# Patient Record
Sex: FEMALE | Race: WHITE | Employment: FULL TIME | URBAN - METROPOLITAN AREA
[De-identification: names, ages, dates, MRNs, and addresses within clinical notes are randomized per-mention and may not be internally consistent; named-entity substitution may affect disease eponyms.]

---

## 2018-08-21 PROBLEM — Z34.90 PREGNANCY: Status: ACTIVE | Noted: 2018-08-21

## 2018-08-21 PROBLEM — O09.30 LATE PRENATAL CARE AFFECTING PREGNANCY: Status: ACTIVE | Noted: 2018-08-21

## 2018-08-21 PROBLEM — Q87.19 NOONAN SYNDROME: Status: ACTIVE | Noted: 2018-08-21

## 2018-09-05 PROBLEM — F19.10 POLYSUBSTANCE ABUSE (HCC): Status: RESOLVED | Noted: 2017-07-25 | Resolved: 2018-09-05

## 2018-09-05 PROBLEM — F10.20 SEVERE ALCOHOL USE DISORDER (HCC): Status: ACTIVE | Noted: 2017-06-15

## 2018-09-05 PROBLEM — T50.901A OVERDOSE: Status: ACTIVE | Noted: 2017-06-12

## 2018-09-05 PROBLEM — F10.929 ALCOHOLIC INTOXICATION (HCC): Status: ACTIVE | Noted: 2017-06-12

## 2018-09-05 PROBLEM — J69.0 ASPIRATION PNEUMONIA OF LEFT UPPER LOBE (HCC): Status: RESOLVED | Noted: 2017-07-25 | Resolved: 2018-09-05

## 2018-09-05 PROBLEM — I46.9 CARDIAC ARREST (HCC): Status: ACTIVE | Noted: 2017-06-12

## 2018-09-05 PROBLEM — F10.929 ALCOHOLIC INTOXICATION (HCC): Status: RESOLVED | Noted: 2017-06-12 | Resolved: 2018-09-05

## 2018-09-05 PROBLEM — F25.0 SCHIZOAFFECTIVE DISORDER, BIPOLAR TYPE (HCC): Status: ACTIVE | Noted: 2017-06-15

## 2018-09-05 PROBLEM — F19.10 POLYSUBSTANCE ABUSE (HCC): Status: ACTIVE | Noted: 2017-07-25

## 2018-09-05 PROBLEM — J69.0 ASPIRATION PNEUMONIA OF LEFT UPPER LOBE (HCC): Status: ACTIVE | Noted: 2017-07-25

## 2018-09-05 PROBLEM — F10.20 SEVERE ALCOHOL USE DISORDER (HCC): Status: RESOLVED | Noted: 2017-06-15 | Resolved: 2018-09-05

## 2018-09-05 PROBLEM — I46.9 CARDIAC ARREST (HCC): Status: RESOLVED | Noted: 2017-06-12 | Resolved: 2018-09-05

## 2018-09-05 PROBLEM — T50.901A OVERDOSE: Status: RESOLVED | Noted: 2017-06-12 | Resolved: 2018-09-05

## 2018-10-04 PROBLEM — Z23 ENCOUNTER FOR IMMUNIZATION: Status: ACTIVE | Noted: 2018-10-04

## 2019-01-25 RX ORDER — OXYTOCIN/RINGER'S LACTATE 15/250 ML
250 PLASTIC BAG, INJECTION (ML) INTRAVENOUS ONCE
Status: CANCELLED | OUTPATIENT
Start: 2019-01-25 | End: 2019-01-25

## 2019-01-25 RX ORDER — MINERAL OIL
120 OIL (ML) ORAL
Status: CANCELLED | OUTPATIENT
Start: 2019-01-25 | End: 2019-01-26

## 2019-01-25 RX ORDER — SODIUM CHLORIDE 0.9 % (FLUSH) 0.9 %
5-40 SYRINGE (ML) INJECTION AS NEEDED
Status: CANCELLED | OUTPATIENT
Start: 2019-01-25

## 2019-01-25 RX ORDER — OXYTOCIN/RINGER'S LACTATE 30/500 ML
0-25 PLASTIC BAG, INJECTION (ML) INTRAVENOUS
Status: CANCELLED | OUTPATIENT
Start: 2019-01-25

## 2019-01-25 RX ORDER — DEXTROSE, SODIUM CHLORIDE, SODIUM LACTATE, POTASSIUM CHLORIDE, AND CALCIUM CHLORIDE 5; .6; .31; .03; .02 G/100ML; G/100ML; G/100ML; G/100ML; G/100ML
125 INJECTION, SOLUTION INTRAVENOUS CONTINUOUS
Status: CANCELLED | OUTPATIENT
Start: 2019-01-25

## 2019-01-25 RX ORDER — BUTORPHANOL TARTRATE 1 MG/ML
1 INJECTION INTRAMUSCULAR; INTRAVENOUS
Status: CANCELLED | OUTPATIENT
Start: 2019-01-25

## 2019-01-25 RX ORDER — LIDOCAINE HYDROCHLORIDE 20 MG/ML
JELLY TOPICAL
Status: CANCELLED | OUTPATIENT
Start: 2019-01-25 | End: 2019-01-26

## 2019-01-25 RX ORDER — LIDOCAINE HYDROCHLORIDE 10 MG/ML
1 INJECTION INFILTRATION; PERINEURAL
Status: CANCELLED | OUTPATIENT
Start: 2019-01-25 | End: 2019-01-26

## 2019-01-25 RX ORDER — SODIUM CHLORIDE 0.9 % (FLUSH) 0.9 %
5-40 SYRINGE (ML) INJECTION EVERY 8 HOURS
Status: CANCELLED | OUTPATIENT
Start: 2019-01-25

## 2019-01-29 ENCOUNTER — ANESTHESIA (OUTPATIENT)
Dept: LABOR AND DELIVERY | Age: 24
End: 2019-01-29
Payer: COMMERCIAL

## 2019-01-29 ENCOUNTER — HOSPITAL ENCOUNTER (INPATIENT)
Age: 24
LOS: 2 days | Discharge: HOME | End: 2019-01-31
Attending: OBSTETRICS & GYNECOLOGY | Admitting: OBSTETRICS & GYNECOLOGY
Payer: COMMERCIAL

## 2019-01-29 ENCOUNTER — ANESTHESIA EVENT (OUTPATIENT)
Dept: LABOR AND DELIVERY | Age: 24
End: 2019-01-29
Payer: COMMERCIAL

## 2019-01-29 PROBLEM — R10.9 ABDOMINAL PAIN DURING PREGNANCY, THIRD TRIMESTER: Status: ACTIVE | Noted: 2019-01-29

## 2019-01-29 PROBLEM — O26.893 ABDOMINAL PAIN DURING PREGNANCY, THIRD TRIMESTER: Status: ACTIVE | Noted: 2019-01-29

## 2019-01-29 LAB
ABO + RH BLD: NORMAL
ARTERIAL PATENCY WRIST A: ABNORMAL
BASE DEFICIT BLD-SCNC: 8 MMOL/L
BDY SITE: ABNORMAL
BLOOD GROUP ANTIBODIES SERPL: NORMAL
BODY TEMPERATURE: 98.6
CO2 BLD-SCNC: 22 MMOL/L
COLLECT TIME,HTIME: 2311
ERYTHROCYTE [DISTWIDTH] IN BLOOD BY AUTOMATED COUNT: 14.2 % (ref 11.9–14.6)
GAS FLOW.O2 O2 DELIVERY SYS: ABNORMAL L/MIN
HCO3 BLD-SCNC: 20.1 MMOL/L (ref 22–26)
HCT VFR BLD AUTO: 34.2 % (ref 35.8–46.3)
HGB BLD-MCNC: 11.5 G/DL (ref 11.7–15.4)
MCH RBC QN AUTO: 30.9 PG (ref 26.1–32.9)
MCHC RBC AUTO-ENTMCNC: 33.6 G/DL (ref 31.4–35)
MCV RBC AUTO: 91.9 FL (ref 79.6–97.8)
NRBC # BLD: 0 K/UL (ref 0–0.2)
PCO2 BLDCO: 48 MMHG (ref 32–68)
PH BLDCO: 7.23 [PH] (ref 7.15–7.38)
PLATELET # BLD AUTO: 353 K/UL (ref 150–450)
PMV BLD AUTO: 10.1 FL (ref 9.4–12.3)
PO2 BLDCO: 44 MMHG
RBC # BLD AUTO: 3.72 M/UL (ref 4.05–5.2)
SAO2 % BLD: 70 % (ref 95–98)
SERVICE CMNT-IMP: ABNORMAL
SPECIMEN EXP DATE BLD: NORMAL
SPECIMEN TYPE: ABNORMAL
WBC # BLD AUTO: 11 K/UL (ref 4.3–11.1)

## 2019-01-29 PROCEDURE — 74011250636 HC RX REV CODE- 250/636: Performed by: OBSTETRICS & GYNECOLOGY

## 2019-01-29 PROCEDURE — 77030002888 HC SUT CHRMC J&J -A

## 2019-01-29 PROCEDURE — 4A1HXCZ MONITORING OF PRODUCTS OF CONCEPTION, CARDIAC RATE, EXTERNAL APPROACH: ICD-10-PCS | Performed by: OBSTETRICS & GYNECOLOGY

## 2019-01-29 PROCEDURE — 65270000029 HC RM PRIVATE

## 2019-01-29 PROCEDURE — 0UQMXZZ REPAIR VULVA, EXTERNAL APPROACH: ICD-10-PCS | Performed by: OBSTETRICS & GYNECOLOGY

## 2019-01-29 PROCEDURE — 85027 COMPLETE CBC AUTOMATED: CPT

## 2019-01-29 PROCEDURE — 99284 EMERGENCY DEPT VISIT MOD MDM: CPT | Performed by: OBSTETRICS & GYNECOLOGY

## 2019-01-29 PROCEDURE — 74011250636 HC RX REV CODE- 250/636

## 2019-01-29 PROCEDURE — A4300 CATH IMPL VASC ACCESS PORTAL: HCPCS | Performed by: ANESTHESIOLOGY

## 2019-01-29 PROCEDURE — 82803 BLOOD GASES ANY COMBINATION: CPT

## 2019-01-29 PROCEDURE — 86900 BLOOD TYPING SEROLOGIC ABO: CPT

## 2019-01-29 PROCEDURE — 74011000250 HC RX REV CODE- 250

## 2019-01-29 PROCEDURE — 77030014125 HC TY EPDRL BBMI -B: Performed by: ANESTHESIOLOGY

## 2019-01-29 RX ORDER — MINERAL OIL
120 OIL (ML) ORAL
Status: DISCONTINUED | OUTPATIENT
Start: 2019-01-29 | End: 2019-01-30 | Stop reason: HOSPADM

## 2019-01-29 RX ORDER — ONDANSETRON 2 MG/ML
INJECTION INTRAMUSCULAR; INTRAVENOUS
Status: DISCONTINUED
Start: 2019-01-29 | End: 2019-01-30

## 2019-01-29 RX ORDER — SODIUM CHLORIDE 0.9 % (FLUSH) 0.9 %
5-40 SYRINGE (ML) INJECTION AS NEEDED
Status: DISCONTINUED | OUTPATIENT
Start: 2019-01-29 | End: 2019-01-30

## 2019-01-29 RX ORDER — OXYTOCIN/RINGER'S LACTATE 30/500 ML
1-25 PLASTIC BAG, INJECTION (ML) INTRAVENOUS
Status: DISCONTINUED | OUTPATIENT
Start: 2019-01-29 | End: 2019-01-30

## 2019-01-29 RX ORDER — DEXTROSE, SODIUM CHLORIDE, SODIUM LACTATE, POTASSIUM CHLORIDE, AND CALCIUM CHLORIDE 5; .6; .31; .03; .02 G/100ML; G/100ML; G/100ML; G/100ML; G/100ML
125 INJECTION, SOLUTION INTRAVENOUS CONTINUOUS
Status: DISCONTINUED | OUTPATIENT
Start: 2019-01-29 | End: 2019-01-30

## 2019-01-29 RX ORDER — OXYTOCIN/RINGER'S LACTATE 15/250 ML
250 PLASTIC BAG, INJECTION (ML) INTRAVENOUS ONCE
Status: ACTIVE | OUTPATIENT
Start: 2019-01-29 | End: 2019-01-29

## 2019-01-29 RX ORDER — ONDANSETRON 2 MG/ML
4 INJECTION INTRAMUSCULAR; INTRAVENOUS ONCE
Status: DISCONTINUED | OUTPATIENT
Start: 2019-01-29 | End: 2019-01-30

## 2019-01-29 RX ORDER — LIDOCAINE HYDROCHLORIDE 10 MG/ML
1 INJECTION INFILTRATION; PERINEURAL
Status: DISCONTINUED | OUTPATIENT
Start: 2019-01-29 | End: 2019-01-30 | Stop reason: HOSPADM

## 2019-01-29 RX ORDER — SODIUM CHLORIDE 0.9 % (FLUSH) 0.9 %
5-40 SYRINGE (ML) INJECTION EVERY 8 HOURS
Status: DISCONTINUED | OUTPATIENT
Start: 2019-01-29 | End: 2019-01-30

## 2019-01-29 RX ORDER — LIDOCAINE HYDROCHLORIDE 20 MG/ML
JELLY TOPICAL
Status: DISCONTINUED | OUTPATIENT
Start: 2019-01-29 | End: 2019-01-30 | Stop reason: HOSPADM

## 2019-01-29 RX ORDER — PHENYLEPHRINE HYDROCHLORIDE 10 MG/ML
INJECTION INTRAVENOUS AS NEEDED
Status: DISCONTINUED | OUTPATIENT
Start: 2019-01-29 | End: 2019-01-29 | Stop reason: HOSPADM

## 2019-01-29 RX ORDER — OXYTOCIN/RINGER'S LACTATE 30/500 ML
PLASTIC BAG, INJECTION (ML) INTRAVENOUS
Status: COMPLETED
Start: 2019-01-29 | End: 2019-01-29

## 2019-01-29 RX ORDER — BUTORPHANOL TARTRATE 2 MG/ML
1 INJECTION INTRAMUSCULAR; INTRAVENOUS
Status: DISCONTINUED | OUTPATIENT
Start: 2019-01-29 | End: 2019-01-30 | Stop reason: HOSPADM

## 2019-01-29 RX ORDER — ROPIVACAINE HYDROCHLORIDE 2 MG/ML
INJECTION, SOLUTION EPIDURAL; INFILTRATION; PERINEURAL
Status: DISCONTINUED | OUTPATIENT
Start: 2019-01-29 | End: 2019-01-29 | Stop reason: HOSPADM

## 2019-01-29 RX ADMIN — OXYTOCIN 2 MILLI-UNITS: 10 INJECTION, SOLUTION INTRAMUSCULAR; INTRAVENOUS at 16:49

## 2019-01-29 RX ADMIN — SODIUM CHLORIDE, SODIUM LACTATE, POTASSIUM CHLORIDE, CALCIUM CHLORIDE, AND DEXTROSE MONOHYDRATE 125 ML/HR: 600; 310; 30; 20; 5 INJECTION, SOLUTION INTRAVENOUS at 16:50

## 2019-01-29 RX ADMIN — PHENYLEPHRINE HYDROCHLORIDE 100 MCG: 10 INJECTION INTRAVENOUS at 18:16

## 2019-01-29 RX ADMIN — OXYTOCIN 8 MILLI-UNITS/MIN: 10 INJECTION, SOLUTION INTRAMUSCULAR; INTRAVENOUS at 19:53

## 2019-01-29 RX ADMIN — BUTORPHANOL TARTRATE 1 MG: 2 INJECTION, SOLUTION INTRAMUSCULAR; INTRAVENOUS at 11:44

## 2019-01-29 RX ADMIN — SODIUM CHLORIDE, SODIUM LACTATE, POTASSIUM CHLORIDE, AND CALCIUM CHLORIDE 500 ML: 600; 310; 30; 20 INJECTION, SOLUTION INTRAVENOUS at 18:21

## 2019-01-29 RX ADMIN — ROPIVACAINE HYDROCHLORIDE 10 ML/HR: 2 INJECTION, SOLUTION EPIDURAL; INFILTRATION; PERINEURAL at 18:07

## 2019-01-29 NOTE — PROGRESS NOTES
SW consult received - will meet with patient after delivery as she's currently in labor. Mary Grace Becerril [address]

## 2019-01-29 NOTE — PROGRESS NOTES
Pt does not want to be admitted. Pt would like to go home. Insistent to go home until she is 42 weeks. Dr. Waldemar Herbert stressed the importance of deliverying now. Would like to walk and then be rechecked before deciding

## 2019-01-29 NOTE — PROGRESS NOTES
RN to bedside spoke with patient, notified that the patient could not come off the EFM at the present time due to a non-reactive FHR tracing Encouraged patient to go into a left tilt and a LR fluid bolus. Patient agreed to interventions

## 2019-01-29 NOTE — H&P
History & Physical 
 
Name: Imelda Pyle MRN: [id number]  SSN: [id number] YOB: 1995  Age: 26 y.o. Sex: female Chief Complaint Patient presents with  Contractions Subjective:  
 
Estimated Date of Delivery: 19 OB History  Para Term  AB Living 1 SAB TAB Ectopic Molar Multiple Live Births # Outcome Date GA Lbr Zia/2nd Weight Sex Delivery Anes PTL Lv  
1 Current Ms. Lorenza Atkins is seen with pregnancy at 41w1d for Increasingly painful contractions. Prenatal course was complicated by late prenatal care and psychotic issues. Was scheduled for induction last week, did not came due to transportation issues. the patients states that the baby moves as usual 
 Please see prenatal records for details. Past Medical History:  
Diagnosis Date  Alcoholic intoxication ([address] 2017 Last Assessment & Plan:  Alcohol level 136.  Aspiration pneumonia of left upper lobe ([address] 2017 Last Assessment & Plan:  Patient had an episode of aspiration pneumonia when she was admitted to hospital requiring mechanical ventilation and high levels of FiO2 and PEEP. She is now much better and back to baseline. Her chest x-ray shows complete resolution of the infiltrates. Do not think she needs any further follow-up.  Cardiac arrest ([address] 2017 Last Assessment & Plan:  S/p cardiac arrest. Patient realizes the significance of that event and the need to avoid the substance abuse that led to it. She is determined not to have that repeat.  Overdose 2017 Last Assessment & Plan:  Admitted to using cocaine, MDMA, marijuana and alcohol. UDS positive for cocaine and sympathomimetic amines. Will closely monitor BP and heart rate. Supportive care.  Polysubstance abuse ([address] 2017  Last Assessment & Plan:  She has abused multiple illegal substances in the past but is now being treated by a psychiatrist for her mood disorder and does not think she is going to use any of those anymore.  Severe alcohol use disorder ([facility] 6/15/2017 Past Surgical History:  
Procedure Laterality Date  HX WISDOM TEETH EXTRACTION Social History Occupational History  Not on file Tobacco Use  Smoking status: Former Smoker  Smokeless tobacco: Never Used  Tobacco comment: Socially Substance and Sexual Activity  Alcohol use: No  
 Drug use: No  
 Sexual activity: Yes  
  Partners: Male Birth control/protection: None Family History Problem Relation Age of Onset  Diabetes Maternal Grandmother No Known Allergies Prior to Admission medications Medication Sig Start Date End Date Taking? Authorizing Provider  
prenatal 47/iron/folate 1/dha (PNV-DHA PO) Take  by mouth. Yes Provider, Historical  
magnesium 250 mg tab Take  by mouth. Provider, Historical  
  
 
Review of Systems: 
Constitutional:No headache, fever Cardiac:   No chest pain Resp: No cough or shortness of breath GI:   No nausea/vomiting, diarrhea, abdominal pain :   No dysuria Neuro:     No vision changes, headache Objective:  
 
Vitals: 
Vitals:  
 01/29/19 0242 BP: 114/73 Pulse: 65 Resp: 18 Temp: 98.1 °F (36.7 °C) Physical Exam: 
Patient without distress. Heart: Regular rate and rhythm Lung: clear to auscultation throughout lung fields, no wheezes, no rales, no rhonchi and normal respiratory effort Back: costovertebral angle tenderness absent Abdomen: soft, nontender, without guarding, without rebound Fundus: soft and non tender Cervical Exam: 1 cm dilated 60% effaced 0 station Lower Extremities:  - Edema No 
Membranes:  Intact Fetal Heart Rate tracing: Category 1 Uterine contractions: irregular, every 5 minutes Prenatal Labs:  
Lab Results Component Value Date/Time  
 Rubella, External immune 08/21/2018 GrBStrep, External negative 2018 HBsAg, External negative 2018 HIV, External NR 2018 RPR, External NR 2018 Gonorrhea, External negative 2018 Chlamydia, External negative 2018 Assessment/Plan: Ms. Yo Steward is a  seen with pregnancy at 41w1d for Increasingly painful contractions. Post term at 41+ weeks. Plan:  
 
Admit for labor management, will start buccal cytotech Patient discussed with Dr. Scotty Evans MD

## 2019-01-29 NOTE — PROGRESS NOTES
Spoke with Dr Jhon Infante on the phone. MD ok with pt going home after reactive NST, negative CST, if pt continues to refuse augmentation, monitoring and other medical interventions at this time.

## 2019-01-29 NOTE — PROGRESS NOTES
Pt called out and asked to be checked. Cervix remains on the firmish side but is more mid than posterior now and is a very tight three. Pt encouraged to ambulate more. Will recheck at 0600

## 2019-01-29 NOTE — PROGRESS NOTES
Patient up to bathroom Desires to be checked when returns to bed Patient mother asked for me not to tell her her cervix hasn't changed if no increase in dilation

## 2019-01-29 NOTE — PROGRESS NOTES
Asked to come talk to patient. She was considering going back home as she is not in active labor. There is discussion among the family members, specifically from the paternal grandmother who is a  and very vocal about it being fine to allow the pregnancy to progress until 42 weeks to allow for spontaneous labor. The patient seems to be uncomfortable and intimidated by the paternal grandmother who is pressuring her to pursue expectant managment. I advised them that risk of  Stillbirth begins to significantly increase after 41 weeks, and that her regular physicians had felt she should be induced. Patient will walk and then be rechecked after a period of time to see if she is making cervical change. Marii Evans MD

## 2019-01-29 NOTE — ANESTHESIA PROCEDURE NOTES
Epidural Block Start time: 1/29/2019 5:58 PM 
End time: 1/29/2019 6:10 PM 
Performed by: Florian Negron MD 
Authorized by: Florian Negron MD  
 
Pre-Procedure Indication: labor epidural   
Preanesthetic Checklist: patient identified, risks and benefits discussed, anesthesia consent, site marked, patient being monitored, timeout performed and anesthesia consent Timeout Time: 17:59 Epidural:  
Patient position:  Seated Prep region:  Lumbar Prep: Chlorhexidine Location:  L3-4 Needle and Epidural Catheter:  
Needle Type:  Tuohy Needle Gauge:  17 G Injection Technique:  Loss of resistance using air Attempts:  1 Catheter Size:  19 G Catheter at Skin Depth (cm):  9 Depth in Epidural Space (cm):  5 Events: no blood with aspiration, no cerebrospinal fluid with aspiration, no paresthesia and negative aspiration test   
Test Dose:  Negative Assessment:  
Catheter Secured:  Tegaderm and tape Insertion:  Uncomplicated Patient tolerance:  Patient tolerated the procedure well with no immediate complications

## 2019-01-29 NOTE — PROGRESS NOTES
SVE dilataion irregular with external os a little more than three and internal a tight three. Cytotec held due to dilation. Admitted and transferred.

## 2019-01-29 NOTE — PROGRESS NOTES
Labor check Cervix 4-5 50 posterior Minimal to no change over last 2 hours Recommended pitocin- adamantly declines refuses AROM 
NST reactive No more narcotics- pt seems somewhat out of it from previous stadol Patient Vitals for the past 4 hrs: Mode Fetal Heart Rate Variability Decelerations Accelerations RN Reviewed Strip? Non Stress Test  
01/29/19 1418 External 125 (!) Less than or equal to 5 BPM None No    
01/29/19 1344 External 120 6-25 BPM None Yes Yes   
01/29/19 1328 External 125 6-25 BPM None Yes Yes   
01/29/19 1321 External 120 6-25 BPM Variable No    
01/29/19 1258 External 120 (!) Less than or equal to 5 BPM Variable No    
01/29/19 1231 External 125 6-25 BPM Variable No Yes   
01/29/19 1206 External 125 6-25 BPM None  Yes   
01/29/19 1154 External 120 6-25 BPM None No Yes   
01/29/19 1136 External 125 6-25 BPM None Yes Yes Reactive

## 2019-01-29 NOTE — PROGRESS NOTES
Pt arrived in [facility]. Complaints of contractions. No bleeding or leaking of fluid. Placed on EFM. Dr. Jammie Palafox at the bedside.

## 2019-01-29 NOTE — PROGRESS NOTES
Patient requesting IV pain medication Patient placed back on [facility]  
 
RN discussed with patient that there had been no cervical change in 2 hours Patient is considering agreeing to augmentation, wants to wait 1 more hour and then have cervix rechecked

## 2019-01-30 PROCEDURE — 74011250637 HC RX REV CODE- 250/637: Performed by: OBSTETRICS & GYNECOLOGY

## 2019-01-30 PROCEDURE — 75410000003 HC RECOV DEL/VAG/CSECN EA 0.5 HR

## 2019-01-30 PROCEDURE — 75410000002 HC LABOR FEE PER 1 HR

## 2019-01-30 PROCEDURE — 75410000000 HC DELIVERY VAGINAL/SINGLE

## 2019-01-30 PROCEDURE — 77030011943

## 2019-01-30 PROCEDURE — 76060000078 HC EPIDURAL ANESTHESIA

## 2019-01-30 PROCEDURE — 65270000029 HC RM PRIVATE

## 2019-01-30 RX ORDER — DOCUSATE SODIUM 100 MG/1
100 CAPSULE, LIQUID FILLED ORAL
Status: DISCONTINUED | OUTPATIENT
Start: 2019-01-30 | End: 2019-01-31 | Stop reason: HOSPADM

## 2019-01-30 RX ORDER — DIPHENHYDRAMINE HCL 25 MG
25 CAPSULE ORAL
Status: DISCONTINUED | OUTPATIENT
Start: 2019-01-30 | End: 2019-01-31 | Stop reason: HOSPADM

## 2019-01-30 RX ORDER — ZOLPIDEM TARTRATE 5 MG/1
5 TABLET ORAL
Status: DISCONTINUED | OUTPATIENT
Start: 2019-01-30 | End: 2019-01-31 | Stop reason: HOSPADM

## 2019-01-30 RX ORDER — HYDROCODONE BITARTRATE AND ACETAMINOPHEN 5; 325 MG/1; MG/1
1 TABLET ORAL
Status: DISCONTINUED | OUTPATIENT
Start: 2019-01-30 | End: 2019-01-31 | Stop reason: HOSPADM

## 2019-01-30 RX ORDER — IBUPROFEN 400 MG/1
400 TABLET ORAL
Status: DISCONTINUED | OUTPATIENT
Start: 2019-01-30 | End: 2019-01-31 | Stop reason: HOSPADM

## 2019-01-30 RX ORDER — NALOXONE HYDROCHLORIDE 0.4 MG/ML
0.4 INJECTION, SOLUTION INTRAMUSCULAR; INTRAVENOUS; SUBCUTANEOUS AS NEEDED
Status: DISCONTINUED | OUTPATIENT
Start: 2019-01-30 | End: 2019-01-31 | Stop reason: HOSPADM

## 2019-01-30 RX ORDER — HYDROCODONE BITARTRATE AND ACETAMINOPHEN 7.5; 325 MG/1; MG/1
1 TABLET ORAL
Status: DISCONTINUED | OUTPATIENT
Start: 2019-01-30 | End: 2019-01-31 | Stop reason: HOSPADM

## 2019-01-30 RX ORDER — SIMETHICONE 80 MG
80 TABLET,CHEWABLE ORAL
Status: DISCONTINUED | OUTPATIENT
Start: 2019-01-30 | End: 2019-01-31 | Stop reason: HOSPADM

## 2019-01-30 RX ADMIN — IBUPROFEN 400 MG: 400 TABLET, FILM COATED ORAL at 02:27

## 2019-01-30 RX ADMIN — IBUPROFEN 400 MG: 400 TABLET, FILM COATED ORAL at 15:20

## 2019-01-30 RX ADMIN — HYDROCODONE BITARTRATE AND ACETAMINOPHEN 1 TABLET: 5; 325 TABLET ORAL at 02:27

## 2019-01-30 RX ADMIN — WITCH HAZEL 1 PAD: 500 SOLUTION RECTAL; TOPICAL at 02:30

## 2019-01-30 RX ADMIN — DOCUSATE SODIUM 100 MG: 100 CAPSULE, LIQUID FILLED ORAL at 16:40

## 2019-01-30 RX ADMIN — HYDROCODONE BITARTRATE AND ACETAMINOPHEN 1 TABLET: 5; 325 TABLET ORAL at 06:19

## 2019-01-30 RX ADMIN — HYDROCODONE BITARTRATE AND ACETAMINOPHEN 1 TABLET: 5; 325 TABLET ORAL at 15:20

## 2019-01-30 RX ADMIN — HYDROCODONE BITARTRATE AND ACETAMINOPHEN 1 TABLET: 5; 325 TABLET ORAL at 21:29

## 2019-01-30 RX ADMIN — IBUPROFEN 400 MG: 400 TABLET, FILM COATED ORAL at 21:29

## 2019-01-30 RX ADMIN — IBUPROFEN 400 MG: 400 TABLET, FILM COATED ORAL at 06:19

## 2019-01-30 NOTE — PROGRESS NOTES
unsuccessfully attempted to meet with patient several times throughout the day - patient either with lactation or with multiple visitors.  will follow-up with patient in the AM. Mary Grace [address] 36

## 2019-01-30 NOTE — ANESTHESIA POSTPROCEDURE EVALUATION
* No procedures listed *. Anesthesia Post Evaluation Multimodal analgesia: multimodal analgesia not used between 6 hours prior to anesthesia start to PACU discharge Patient location during evaluation: PACU Patient participation: complete - patient participated Level of consciousness: awake and alert Pain management: adequate Airway patency: patent Anesthetic complications: no 
Cardiovascular status: acceptable Respiratory status: acceptable Hydration status: acceptable Post anesthesia nausea and vomiting:  none Visit Vitals /63 (BP 1 Location: Right arm, BP Patient Position: At rest) Pulse (!) 105 Temp 37.2 °C (99 °F) Resp 18 SpO2 96% Breastfeeding? Unknown

## 2019-01-30 NOTE — ANESTHESIA PREPROCEDURE EVALUATION
Anesthetic History No history of anesthetic complications Review of Systems / Medical History Patient summary reviewed and pertinent labs reviewed Pulmonary Comments: H/O respiratory and cardiac arrest drug overdose summer 2017, h/o aspiration pneumonitis as well last summer Neuro/Psych Within defined limits Cardiovascular Within defined limits Exercise tolerance: >4 METS 
  
GI/Hepatic/Renal 
Within defined limits Endo/Other Within defined limits Other Findings Comments: Substance abuse Physical Exam 
 
Airway Mallampati: II 
TM Distance: 4 - 6 cm Neck ROM: normal range of motion Mouth opening: Normal 
 
 Cardiovascular Rhythm: regular Dental 
No notable dental hx Pulmonary Breath sounds clear to auscultation Abdominal 
GI exam deferred Other Findings Anesthetic Plan ASA: 3 Anesthesia type: epidural 
 
 
 
 
 
Anesthetic plan and risks discussed with: Patient

## 2019-01-30 NOTE — PROGRESS NOTES
Post-Partum Day Number 1 Progress Note Patient doing well  without significant complaints. Pain controlled on current medication. Voiding without difficulty, normal lochia. Vitals:   
Visit Vitals /71 Pulse 72 Temp 97.8 °F (36.6 °C) Resp 16 LMP  (LMP Unknown) SpO2 97% Breastfeeding? Unknown Vital signs stable, afebrile. Exam:  Patient without distress. Heart rrr 
Lungs cta b&s Abdomen soft, fundus firm at level of umbilicus, nontender. Lower extremities are negative for swelling, cords or tenderness. Lab Results Component Value Date/Time ABO Group A 08/21/2018 04:11 PM  
 Rh (D) Positive 08/21/2018 04:11 PM  
 ABO/Rh(D) A POSITIVE 01/29/2019 05:57 AM  
 
  
Lab Results Component Value Date/Time ABO/Rh(D) A POSITIVE 01/29/2019 05:57 AM  
 Antibody screen NEG 01/29/2019 05:57 AM  
 Antibody screen, External negative 08/21/2018 Rubella, External immune 08/21/2018 GrBStrep, External negative 12/28/2018 ABO,Rh A positive 08/21/2018 Lab Results Component Value Date/Time HGB 11.5 (L) 01/29/2019 05:57 AM  
 Hgb, External 12.3 08/21/2018 Assessment and Plan:  Patient appears to be having uncomplicated post-partum course. Continue routine post-delivery care and maternal education. Breastfeeding. Anticipate discharge home tomorrow.

## 2019-01-30 NOTE — LACTATION NOTE
In to see mom and infant for the first time. Infant was asleep in mom's arms. Mom stated that she had nursed infant earlier but that he has been sleepy and had not nursed in a while. Offered to assist with a feeding. Instructed mom to undress infant and then showed her how to wake infant by attempting to burp him. Infant woke and started to show hunger cues. Mom placed infant to her left breast in the football hold and I helped her to position him to get a deep latch. Infant latched and started sucking rhythmically. Infant nursed for 25 minutes and came off content. Mom offered infant the right breast in football hold. Infant latched and fell asleep. Reviewed the second night of life with mom.  Lactation consultant will follow up in am.

## 2019-01-30 NOTE — PROGRESS NOTES
6/c/-2 Do not feel bog of water with more aggressive check and stretching cervix to see if SROM pt noted to have clear fluid- thinks she may have ruptured around 6pm 
Cervix- 7/C/-1 Will place on peanut Patient Vitals for the past 4 hrs: Mode Fetal Heart Rate Fetal Activity Variability Decelerations Accelerations RN Reviewed Strip? 01/29/19 1945 External 125  (!) Less than or equal to 5 BPM None Yes   
01/29/19 1930 External 125  6-25 BPM None Yes   
01/29/19 1915 External 115  6-25 BPM None Yes   
01/29/19 1901 External 115  6-25 BPM None Yes Yes  
01/29/19 1841 External 115  6-25 BPM None Yes Yes  
01/29/19 1828 External 120  6-25 BPM None Yes Yes  
01/29/19 1813 External 125  6-25 BPM Variable Yes Yes  
01/29/19 1715 External 140 Present 6-25 BPM None Yes Yes

## 2019-01-30 NOTE — LACTATION NOTE

## 2019-01-30 NOTE — L&D DELIVERY NOTE
Delivery Summary    Patient: Patricio Ruano MRN: [id number]  SSN: [id number]    YOB: 1995  Age: 26 y.o. Sex: female       Information for the patient's :  Rajan Retana Pending [[id number]]       Labor Events:    Labor: No   Rupture Date:     Rupture Time:     Rupture Type SROM   Amniotic Fluid Volume: Moderate    Amniotic Fluid Description: Clear None   Induction: None       Augmentation: Oxytocin   Labor Events: None     Cervical Ripening:     None     Delivery Events:  Episiotomy: None   Laceration(s): Left periurethral;Right labial;Left labial     Repaired: Yes    Number of Repair Packets: 2   Suture Type and Size: Chromic 2-0  Chromic 3-0     Estimated Blood Loss (ml): 300ml       Delivery Date: 2019    Delivery Time: 11:11 PM  Delivery Type: Vaginal, Spontaneous  Sex:       Gestational Age: 40w1d   Delivery Clinician:  Jc Madrigal  Living Status: Living   Delivery Location: L&D            APGARS  One minute Five minutes Ten minutes   Skin color: 1   1        Heart rate: 2   2        Grimace: 2   2        Muscle tone: 2   2        Breathin   2        Totals: 9   9            Presentation: Vertex    Position: Left Occiput Anterior  Resuscitation Method:  Suctioning-bulb; Tactile Stimulation     Meconium Stained: None      Cord Information: 3 Vessels  Complications: None  Cord around:    Delayed cord clamping? Yes  Cord clamped date/time:   Disposition of Cord Blood: Lab    Blood Gases Sent?: Yes    Placenta:  Date/Time: 2019 11:17 PM  Removal: Spontaneous      Appearance: Normal     [place] Measurements:  Birth Weight:        Birth Length:        Head Circumference:        Chest Circumference:       Abdominal Girth: Other Providers:   TAI SCOTT;ROSHAN GONG;LUIS MIGUEL HARRIS;ALTAGRACIA MEDRANO;AGNIESZKA HUFFMAN;ABY IRWIN, Obstetrician;Primary Nurse;Primary  Nurse; Anesthesiologist;Crna; Charge Nurse           Group B Strep: Lab Results   Component Value Date/Time    GrBStrep, External negative 2018     Information for the patient's :  Daniella Solis, Pending [[id number]]   No results found for: ABORH, PCTABR, PCTDIG, BILI, ABORHEXT, ABORH    No results for input(s): PCO2CB, PO2CB, HCO3I, SO2I, IBD, PTEMPI, SPECTI, PHICB, ISITE, IDEV, IALLEN in the last 72 hours. CTSP secondary to precip delivery by RN in bed. On arrival , RN holding cord at perineum, baby on chest. At time of my arrival baby was 3 minutes old, cord no longer pulsating, cord clamped and cut by mom. Cord blood and gases sent. Mom pushed placenta out with one push. Repair of labial lacerations bilaterally and periurethrally in usual fashion.  Excellent hemostasis and cosmesis, cervix inspected no lacerations

## 2019-01-30 NOTE — PROGRESS NOTES
SBAR received by Ken Steven RN. Pt care assumed. Pt resting in bed with eyes closed and lights dimmed.  Call light within reach, bed in low position with wheels locked and side rails up x2

## 2019-01-30 NOTE — ROUTINE PROCESS
SBAR IN Report: Mother Verbal report received from Sherri Velasco RN (full name & credentials) on this patient, who is now being transferred from L&D (unit) for routine progression of care. The patient is not wearing a green \"Anesthesia-Duramorph\" band. Report consisted of patient's Situation, Background, Assessment and Recommendations (SBAR). [employer] ID bands were compared with the identification form, and verified with the patient and transferring nurse. Information from the SBAR and the [facility] Report was reviewed with the transferring nurse; opportunity for questions and clarification provided.

## 2019-01-30 NOTE — ROUTINE PROCESS
SBAR OUT Report: Mother Verbal report given to Jorge Parker RN (full name & credentials) on this patient, who is now being transferred to MI (unit) for routine progression of care. The patient is not wearing a green \"Anesthesia-Duramorph\" band. Report consisted of patient's Situation, Background, Assessment and Recommendations (SBAR).  ID bands were compared with the identification form, and verified with the patient and receiving nurse. Information from the SBAR and the [address] [facility] Report was reviewed with the receiving nurse; opportunity for questions and clarification provided.

## 2019-01-31 VITALS
SYSTOLIC BLOOD PRESSURE: 94 MMHG | DIASTOLIC BLOOD PRESSURE: 60 MMHG | OXYGEN SATURATION: 97 % | RESPIRATION RATE: 16 BRPM | HEART RATE: 63 BPM | TEMPERATURE: 97.5 F

## 2019-01-31 PROCEDURE — 74011250637 HC RX REV CODE- 250/637: Performed by: OBSTETRICS & GYNECOLOGY

## 2019-01-31 RX ORDER — IBUPROFEN 800 MG/1
800 TABLET ORAL
Qty: 35 TAB | Refills: 1 | Status: SHIPPED | OUTPATIENT
Start: 2019-01-31

## 2019-01-31 RX ORDER — DEXTROMETHORPHAN HYDROBROMIDE, GUAIFENESIN 5; 100 MG/5ML; MG/5ML
650 LIQUID ORAL EVERY 8 HOURS
Qty: 30 TAB | Refills: 0 | Status: SHIPPED
Start: 2019-01-31

## 2019-01-31 RX ADMIN — IBUPROFEN 400 MG: 400 TABLET, FILM COATED ORAL at 04:45

## 2019-01-31 RX ADMIN — HYDROCODONE BITARTRATE AND ACETAMINOPHEN 1 TABLET: 5; 325 TABLET ORAL at 04:45

## 2019-01-31 RX ADMIN — IBUPROFEN 400 MG: 400 TABLET, FILM COATED ORAL at 12:43

## 2019-01-31 RX ADMIN — HYDROCODONE BITARTRATE AND ACETAMINOPHEN 1 TABLET: 5; 325 TABLET ORAL at 09:19

## 2019-01-31 RX ADMIN — IBUPROFEN 400 MG: 400 TABLET, FILM COATED ORAL at 09:19

## 2019-01-31 RX ADMIN — DOCUSATE SODIUM 100 MG: 100 CAPSULE, LIQUID FILLED ORAL at 09:19

## 2019-01-31 RX ADMIN — HYDROCODONE BITARTRATE AND ACETAMINOPHEN 1 TABLET: 5; 325 TABLET ORAL at 12:43

## 2019-01-31 NOTE — PROGRESS NOTES
Post-Partum Day Number 2 Progress Note Patient doing well  without significant complaints. Pain controlled on current medication. Voiding without difficulty, normal lochia. Discussed use of tylenol and motrin and pt thinks this will be appropriate. Vitals:   
Visit Vitals BP 94/60 (BP 1 Location: Right arm, BP Patient Position: At rest) Pulse 63 Temp 97.5 °F (36.4 °C) Resp 16 LMP  (LMP Unknown) SpO2 97% Breastfeeding? Unknown Vital signs stable, afebrile. Exam:  Patient without distress. Heart rrr 
Lungs cta b&s Abdomen soft, fundus firm at level of umbilicus, nontender. I             Lower extremities are negative for swelling, cords or tenderness. Lab Results Component Value Date/Time ABO Group A 08/21/2018 04:11 PM  
 Rh (D) Positive 08/21/2018 04:11 PM  
 ABO/Rh(D) A POSITIVE 01/29/2019 05:57 AM  
 
  
Lab Results Component Value Date/Time ABO/Rh(D) A POSITIVE 01/29/2019 05:57 AM  
 Antibody screen NEG 01/29/2019 05:57 AM  
 Antibody screen, External negative 08/21/2018 Rubella, External immune 08/21/2018 GrBStrep, External negative 12/28/2018 ABO,Rh A positive 08/21/2018 Lab Results Component Value Date/Time HGB 11.5 (L) 01/29/2019 05:57 AM  
 Hgb, External 12.3 08/21/2018 Assessment and Plan:  Patient appears to be having uncomplicated post-partum course. Continue routine post-delivery care and maternal education. Breastfeeding. Will discharge home with 2 week pp follow up.

## 2019-01-31 NOTE — LACTATION NOTE

## 2019-01-31 NOTE — LACTATION NOTE
This note was copied from a baby's chart. Lactation visit with 1st time mom, mom states breast feeding is going well, infant just finished feeding. Reviewed discharge teaching including continue feeding on demand 8-10x per day based on hunger cues, wet and dirty requirements, sore nipples and engorgement and remedies and baby's second night, verbalized understanding. Mom and baby are following up with Rashaad benjamin. Offered out patient services.

## 2019-01-31 NOTE — DISCHARGE INSTRUCTIONS
Patient Education        After Your Delivery (the Postpartum Period): Care Instructions  Your Care Instructions    Congratulations on the birth of your baby. Like pregnancy, the  period can be a time of excitement, imelda, and exhaustion. You may look at your wondrous little baby and feel happy. You may also be overwhelmed by your new sleep hours and new responsibilities. At first, babies often sleep during the days and are awake at night. They do not have a pattern or routine. They may make sudden gasps, jerk themselves awake, or look like they have crossed eyes. These are all normal, and they may even make you smile. In these first weeks after delivery, try to take good care of yourself. It may take 4 to 6 weeks to feel like yourself again, and possibly longer if you had a  birth. You will likely feel very tired for several weeks. Your days will be full of ups and downs, but lots of imelda as well. Follow-up care is a key part of your treatment and safety. Be sure to make and go to all appointments, and call your doctor if you are having problems. It's also a good idea to know your test results and keep a list of the medicines you take. How can you care for yourself at home? Take care of your body after delivery  · Use pads instead of tampons for the bloody flow that may last as long as 2 weeks. · Ease cramps with ibuprofen (Advil, Motrin). · Ease soreness of hemorrhoids and the area between your vagina and rectum with ice compresses or witch hazel pads. · Ease constipation by drinking lots of fluid and eating high-fiber foods. Ask your doctor about over-the-counter stool softeners. · Cleanse yourself with a gentle squeeze of warm water from a bottle instead of wiping with toilet paper. · Take a sitz bath in warm water several times a day. · Wear a good nursing bra. Ease sore and swollen breasts with warm, wet washcloths.   · If you are not breastfeeding, use ice rather than heat for breast soreness. · Your period may not start for several months if you are breastfeeding. You may bleed more, and longer at first, than you did before you got pregnant. · Wait until you are healed (about 4 to 6 weeks) before you have sexual intercourse. Your doctor will tell you when it is okay to have sex. · Try not to travel with your baby for 5 or 6 weeks. If you take a long car trip, make frequent stops to walk around and stretch. Avoid exhaustion  · Rest every day. Try to nap when your baby naps. · Ask another adult to be with you for a few days after delivery. · Plan for  if you have other children. · Stay flexible so you can eat at odd hours and sleep when you need to. Both you and your baby are making new schedules. · Plan small trips to get out of the house. Change can make you feel less tired. · Ask for help with housework, cooking, and shopping. Remind yourself that your job is to care for your baby. Know about help for postpartum depression  · \"Baby blues\" are common for the first 1 to 2 weeks after birth. You may cry or feel sad or irritable for no reason. · Rest whenever you can. Being tired makes it harder to handle your emotions. · Go for walks with your baby. · Talk to your partner, friends, and family about your feelings. · If your symptoms last for more than a few weeks, or if you feel very depressed, ask your doctor for help. · Postpartum depression can be treated. Support groups and counseling can help. Sometimes medicine can also help. Stay healthy  · Eat healthy foods so you have more energy and lose extra baby pounds. · If you breastfeed, avoid drugs. If you quit smoking during pregnancy, try to stay smoke-free. If you choose to have a drink now and then, have only one drink, and limit the number of occasions that you have a drink. Wait to breastfeed at least 2 hours after you have a drink to reduce the amount of alcohol the baby may get in the milk.   · Start daily exercise after 4 to 6 weeks, but rest when you feel tired. · Learn exercises to tone your belly. Do Kegel exercises to regain strength in your pelvic muscles. You can do these exercises while you stand or sit. ? Squeeze the same muscles you would use to stop your urine. Your belly and thighs should not move. ? Hold the squeeze for 3 seconds, and then relax for 3 seconds. ? Start with 3 seconds. Then add 1 second each week until you are able to squeeze for 10 seconds. ? Repeat the exercise 10 to 15 times for each session. Do three or more sessions each day. · Find a class for new mothers and new babies that has an exercise time. · If you had a  birth, give yourself a bit more time before you exercise, and be careful. When should you call for help? Call 911 anytime you think you may need emergency care. For example, call if:    · You passed out (lost consciousness).    Call your doctor now or seek immediate medical care if:    · You have severe vaginal bleeding. This means you are passing blood clots and soaking through a pad each hour for 2 or more hours.     · You are dizzy or lightheaded, or you feel like you may faint.     · You have a fever.     · You have new belly pain, or your pain gets worse.    Watch closely for changes in your health, and be sure to contact your doctor if:    · Your vaginal bleeding seems to be getting heavier.     · You have new or worse vaginal discharge.     · You feel sad, anxious, or hopeless for more than a few days.     · You do not get better as expected. Where can you learn more? Go to http://rajwinder-berry.info/. Enter A461 in the search box to learn more about \"After Your Delivery (the Postpartum Period): Care Instructions. \"  Current as of: 2018  Content Version: 11.9  © 8208-2598 [employer].  Care instructions adapted under license by [employer] (which disclaims liability or warranty for this information). If you have questions about a medical condition or this instruction, always ask your healthcare professional. [address] any warranty or liability for your use of this information. Discharge instruction to follow: Activity: Pelvis rest for 6 weeks     No heavy lifting over 15 lbs for 2 weeks         No push/pull motion such as sweeping or vacuuming for 2 weeks     No tub baths for 6 weeks    . If using sitz bath continue until comfortable stopping. If using deloris-bottle continue to use until comfortable stopping. Change sanitary pad after each urination or bowel movement. Call MD for the following:      Fever over 100.4 F; pain not relieved by medication; foul smelling vaginal discharge or an increase in vaginal bleeding. Take medication as prescribed. Follow up with MD as order.

## 2019-01-31 NOTE — PROGRESS NOTES
Chart reviewed - documentation in chart indicates history of schizoaffective disorder, bipolar type. Additionally, patient is a first time parent.  met with patient/FOB (patient's father and another family member present).  offered to come back when her visits were gone, but patient consented to  continuing with assessment.  did not directly address patient's mental health diagnosis. Patient denies any history of depression/anxiety. Per patient, she felt emotionally stable throughout pregnancy.  provided education and pamphlet on [facility] Postpartum  Home Visit Program.  Family was undecided on need for home visit. No referral will be made at this time. Family has this 's contact information should they decide to participate in program.   
 
 provided informational packet on  mood disorder education/resources. Family receptive to receiving information and denied any additional needs from . Patient does not have a PCP - list of PCPs provided. Patient/FOB state that they have car seat, crib, and all needed items to care for . Additionally, they have \"a lot of family\" that live locally and are available for support/assistance. EPDS = 0. Family has this 's contact information should any needs/questions arise. Mar yGrace [address]